# Patient Record
Sex: MALE | Race: WHITE | NOT HISPANIC OR LATINO | Employment: FULL TIME | URBAN - METROPOLITAN AREA
[De-identification: names, ages, dates, MRNs, and addresses within clinical notes are randomized per-mention and may not be internally consistent; named-entity substitution may affect disease eponyms.]

---

## 2020-12-01 ENCOUNTER — NURSE TRIAGE (OUTPATIENT)
Dept: OTHER | Facility: OTHER | Age: 40
End: 2020-12-01

## 2020-12-01 DIAGNOSIS — Z20.828 SARS-ASSOCIATED CORONAVIRUS EXPOSURE: Primary | ICD-10-CM

## 2020-12-02 DIAGNOSIS — Z20.828 SARS-ASSOCIATED CORONAVIRUS EXPOSURE: ICD-10-CM

## 2020-12-02 PROCEDURE — U0003 INFECTIOUS AGENT DETECTION BY NUCLEIC ACID (DNA OR RNA); SEVERE ACUTE RESPIRATORY SYNDROME CORONAVIRUS 2 (SARS-COV-2) (CORONAVIRUS DISEASE [COVID-19]), AMPLIFIED PROBE TECHNIQUE, MAKING USE OF HIGH THROUGHPUT TECHNOLOGIES AS DESCRIBED BY CMS-2020-01-R: HCPCS | Performed by: FAMILY MEDICINE

## 2020-12-03 LAB — SARS-COV-2 RNA SPEC QL NAA+PROBE: NOT DETECTED

## 2024-04-21 ENCOUNTER — NURSE TRIAGE (OUTPATIENT)
Dept: OTHER | Facility: OTHER | Age: 44
End: 2024-04-21

## 2024-04-21 ENCOUNTER — TELEPHONE (OUTPATIENT)
Dept: OTHER | Facility: OTHER | Age: 44
End: 2024-04-21

## 2024-04-21 NOTE — TELEPHONE ENCOUNTER
"Regarding: stomach pain/ appointment  ----- Message from Bia Vences sent at 4/21/2024  6:27 AM EDT -----  \"I would like to schedule an appointment. I have been having issues with my stomach and pain.\"    (Pt may be intoxicated)    "

## 2024-04-21 NOTE — TELEPHONE ENCOUNTER
"Reason for Disposition  • [1] MODERATE pain (e.g., interferes with normal activities) AND [2] pain comes and goes (cramps) AND [3] present > 24 hours  (Exception: pain with Vomiting or Diarrhea - see that Guideline)    Answer Assessment - Initial Assessment Questions  1. LOCATION: \"Where does it hurt?\"       RLQ  2. RADIATION: \"Does the pain shoot anywhere else?\" (e.g., chest, back)      LLQ  3. ONSET: \"When did the pain begin?\" (Minutes, hours or days ago)       3 months ago   4. SUDDEN: \"Gradual or sudden onset?\"      Gradual   5. PATTERN \"Does the pain come and go, or is it constant?\"     - If constant: \"Is it getting better, staying the same, or worsening?\"       (Note: Constant means the pain never goes away completely; most serious pain is constant and it progresses)      - If intermittent: \"How long does it last?\" \"Do you have pain now?\"      (Note: Intermittent means the pain goes away completely between bouts)      Intermittent   6. SEVERITY: \"How bad is the pain?\"  (e.g., Scale 1-10; mild, moderate, or severe)     - MILD (1-3): doesn't interfere with normal activities, abdomen soft and not tender to touch      - MODERATE (4-7): interferes with normal activities or awakens from sleep, tender to touch      - SEVERE (8-10): excruciating pain, doubled over, unable to do any normal activities        Mild-moderate   7. RECURRENT SYMPTOM: \"Have you ever had this type of stomach pain before?\" If Yes, ask: \"When was the last time?\" and \"What happened that time?\"       Similar to appendicitis he had prior . Confirms appendectomy   8. CAUSE: \"What do you think is causing the stomach pain?\"      Denies   9. RELIEVING/AGGRAVATING FACTORS: \"What makes it better or worse?\" (e.g., movement, antacids, bowel movement)     After work   10. OTHER SYMPTOMS: \"Has there been any vomiting, diarrhea, constipation, or urine problems?\"        \"Abdominal bloating\" , diarrhea (liquid stools) for about 2 1/2 months.    Protocols used: " Abdominal Pain - Male-ADULT-AH

## 2024-04-21 NOTE — TELEPHONE ENCOUNTER
C/o persistent lower abdominal pain, bloating, and diarrhea. No additional symptoms reported. Care advice given. Informed to call back if worsening/developing symptoms. Verbalized understanding. Agreeable with disposition. No further questions.

## 2024-11-26 ENCOUNTER — TELEPHONE (OUTPATIENT)
Dept: OTHER | Facility: OTHER | Age: 44
End: 2024-11-26

## 2024-11-26 NOTE — TELEPHONE ENCOUNTER
Patient called to schedule an appointment to establish acre and also to discuss regarding anxiety. Appointment was made for Monday 12/09 at 4:00 pm. Per patient, he would like an email to be sent with appointment information. Please assist      E-mail: [email or web address]  
Alert-The patient is alert, awake and responds to voice. The patient is oriented to time, place, and person. The triage nurse is able to obtain subjective information.

## 2024-12-29 ENCOUNTER — HOSPITAL ENCOUNTER (EMERGENCY)
Facility: HOSPITAL | Age: 44
Discharge: HOME | End: 2024-12-30
Attending: EMERGENCY MEDICINE
Payer: COMMERCIAL

## 2024-12-29 ENCOUNTER — APPOINTMENT (EMERGENCY)
Dept: RADIOLOGY | Facility: HOSPITAL | Age: 44
End: 2024-12-29
Payer: COMMERCIAL

## 2024-12-29 DIAGNOSIS — R03.0 ELEVATED BLOOD PRESSURE READING: Primary | ICD-10-CM

## 2024-12-29 DIAGNOSIS — N50.3 EPIDIDYMAL CYST: ICD-10-CM

## 2024-12-29 DIAGNOSIS — N50.89 TESTICULAR MICROLITHIASIS: ICD-10-CM

## 2024-12-29 LAB
BILIRUB UR QL STRIP: NEGATIVE
CLARITY UR: CLEAR
COLOR UR: NORMAL
GLUCOSE UR STRIP-MCNC: NEGATIVE MG/DL
HGB UR QL STRIP.AUTO: NEGATIVE
KETONES UR STRIP-MCNC: NEGATIVE MG/DL
LEUKOCYTE ESTERASE UR QL STRIP: NEGATIVE
NITRITE UR QL STRIP: NEGATIVE
PH UR STRIP.AUTO: 6 [PH]
PROT UR STRIP-MCNC: NEGATIVE MG/DL
SP GR UR STRIP.AUTO: 1.01 (ref 1–1.03)
UROBILINOGEN UR STRIP-ACNC: <2 MG/DL

## 2024-12-29 PROCEDURE — 76870 US EXAM SCROTUM: CPT

## 2024-12-29 PROCEDURE — 96372 THER/PROPH/DIAG INJ SC/IM: CPT

## 2024-12-29 PROCEDURE — 99284 EMERGENCY DEPT VISIT MOD MDM: CPT

## 2024-12-29 PROCEDURE — 99284 EMERGENCY DEPT VISIT MOD MDM: CPT | Performed by: EMERGENCY MEDICINE

## 2024-12-29 PROCEDURE — 81003 URINALYSIS AUTO W/O SCOPE: CPT

## 2024-12-29 RX ORDER — KETOROLAC TROMETHAMINE 30 MG/ML
15 INJECTION, SOLUTION INTRAMUSCULAR; INTRAVENOUS ONCE
Status: COMPLETED | OUTPATIENT
Start: 2024-12-29 | End: 2024-12-29

## 2024-12-29 RX ORDER — ACETAMINOPHEN 325 MG/1
975 TABLET ORAL ONCE
Status: COMPLETED | OUTPATIENT
Start: 2024-12-29 | End: 2024-12-29

## 2024-12-29 RX ADMIN — ACETAMINOPHEN 975 MG: 325 TABLET, FILM COATED ORAL at 22:29

## 2024-12-29 RX ADMIN — KETOROLAC TROMETHAMINE 15 MG: 30 INJECTION, SOLUTION INTRAMUSCULAR; INTRAVENOUS at 22:38

## 2024-12-30 VITALS
OXYGEN SATURATION: 99 % | HEART RATE: 111 BPM | TEMPERATURE: 97.8 F | SYSTOLIC BLOOD PRESSURE: 132 MMHG | RESPIRATION RATE: 19 BRPM | DIASTOLIC BLOOD PRESSURE: 78 MMHG

## 2024-12-30 NOTE — DISCHARGE INSTRUCTIONS
Your blood pressure was elevated in the emergency department today.  You should make an appointment with your doctor in the next 1 week for follow-up and recheck of the blood pressure.    Please use ice packs, bedrest, jockstrap or compressive underwear for relief of your epididymal cyst related right groin pain    Your ultrasound did demonstrate microlithiasis without concerning/suspicious features, would follow-up with urology regarding these results for further workup/management, additionally the ultrasound demonstrated epididymal cyst bilaterally which may be the cause of your pain, will treat with Motrin and Tylenol as well as the above home regimen

## 2024-12-30 NOTE — ED ATTENDING ATTESTATION
12/29/2024  I, Elgin Harper DO, saw and evaluated the patient. I have discussed the patient with the resident/non-physician practitioner and agree with the resident's/non-physician practitioner's findings, Plan of Care, and MDM as documented in the resident's/non-physician practitioner's note, except where noted. All available labs and Radiology studies were reviewed.  I was present for key portions of any procedure(s) performed by the resident/non-physician practitioner and I was immediately available to provide assistance.       At this point I agree with the current assessment done in the Emergency Department.  I have conducted an independent evaluation of this patient a history and physical is as follows:    Patient is a healthy 44-year-old male accompanied by his girlfriend.  He says the last 1 month he has noticed occasional throbbing in his right testicle and groin, tends to get worse when he standing or moving around, is not there all the time.  No falls, no trauma, no dysuria, no hematuria, no back pain, no fever, no chills, no trauma.  Had an appendectomy as a child but denies any past surgical history.  The patient says that he is sexually and augments with his girlfriend, he has no dyspareunia, no urethral drainage or discharge and his girlfriend says that she has no symptoms that she is aware of.  The patient does not have a primary care physician, has not sought medical show and for this prior to going to the ED, presents tonight because of the symptoms occurring for about a month.  The patient denies any chest pain, no shortness of breath, no visual changes, [place] bladder or bowel changes.    General:  Patient is well-appearing  Head:  Atraumatic  Eyes:  Conjunctiva pink  ENT:  Mucous membranes are moist  Neck:  Supple  Cardiac:  S1-S2, without murmurs  Lungs:  Clear to auscultation bilaterally  Abdomen:  Soft, nontender, normal bowel sounds, no CVA tenderness, no tympany, no rigidity, no  guarding  : Well-developed circumcised male, he has no rash or lesions to the penis or scrotum or perineal area, he has positive bilateral cremasteric reflexes.  He has some slight tenderness to the right epididymis, no other epididymal tenderness, no testicular tenderness, no abnormal testicular lie, no evidence of inguinal hernia  Extremities:  Normal range of motion  Neurologic:  Awake, fluent speech, normal comprehension. AAOx3.   Skin:  Pink warm and dry  Psychiatric:  Alert, pleasant, cooperative          ED Course       US scrotum and testicles   Final Result      No sonographic evidence of suspicious intratesticular lesion, epididymoorchitis, or torsion.      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  In the absence of any other risk factors for testicular cancer (e.g., personal history of testicular cancer, father or brother with testicular cancer, history    of cryptorchidism for maldescent, testicular atrophy, or other risk factors), no further imaging or biochemical follow-up is necessary; all that is recommended is routine monthly testicular self-examination.  However if the patient has risk factors for    testicular cancer, evaluation and determination of an optimal follow-up strategy is deferred to urologist. (Reference: AJR 2016: [id number].)      Epididymal cysts, right greater than left and other findings as above.      Workstation performed: [id number]           Labs Reviewed   UA W REFLEX TO MICROSCOPIC WITH REFLEX TO CULTURE       Result Value Ref Range Status    Color, UA Light Yellow   Final    Clarity, UA Clear   Final    Specific Gravity, UA 1.007  1.003 - 1.030 Final    pH, UA 6.0  4.5, 5.0, 5.5, 6.0, 6.5, 7.0, 7.5, 8.0 Final    Leukocytes, UA Negative  Negative Final    Nitrite, UA Negative  Negative Final    Protein, UA Negative  Negative mg/dl Final    Glucose, UA Negative  Negative mg/dl Final    Ketones, UA Negative  Negative  mg/dl Final    Urobilinogen, UA <2.0  <2.0 mg/dl mg/dl Final    Bilirubin, UA Negative  Negative Final    Occult Blood, UA Negative  Negative Final     On reassessment patient was feeling a little more comfortable.  At this point the cause of the patient's symptoms is unclear but unlikely to represent an acute emergency.  There is no evidence of testicular torsion, no evidence of epididymitis, no evidence of cellulitis or Lashanda's gangrene, no evidence of urinary tract infection.  Counseled no STI symptoms, do not believe that STI testing is indicated.  The ultrasound does show testicular microlithiasis bilaterally, this abnormality was discussed with the patient and patient was referred to urology for further follow-up. Supportive care, importance of follow-up and return precautions were discussed with the patient, who expressed understanding.    While the patient has been hypertensive in the emergency department, there is no evidence of hypertensive emergency, no evidence of end-organ damage on exam or per history. I do not believe the patient requires emergent lowering of their blood pressure. I considered obtaining laboratory studies however I believe it is reasonable for the patient to be discharged and have outpatient follow up for reassessment of their blood pressure, and if it is still elevated, for additional decisions regarding their management to be made by a primary care physician. I did discuss with the patient their elevated blood pressure reading, the importance of follow up for their elevated blood pressure reading, and the risks associated with untreated hypertension.      DIAGNOSIS:  Acute testicular pain, elevated blood pressure reading    MEDICAL DECISION MAKING CODING    COLLECTION AND INTERPRETATION OF DATA    I ordered each unique test  Tests reviewed personally by me:  Labs: See above, no infection  Imaging: I independently interpreted the testicular ultrasound, and found no evidence of  testicular torsion, no epididymitis.                Critical Care Time  Procedures

## 2024-12-30 NOTE — ED PROVIDER NOTES
Time reflects when diagnosis was documented in both MDM as applicable and the Disposition within this note       Time User Action Codes Description Comment    12/29/2024 10:21 PM Elgin Harper Add [R03.0] Elevated blood pressure reading     12/30/2024 12:28 AM Leopoldo Mead Add [N50.3] Epididymal cyst     12/30/2024 12:28 AM Leopoldo Mead Add [N50.89] Testicular microlithiasis           ED Disposition       ED Disposition   Discharge    Condition   Stable    Date/Time   Mon Dec 30, 2024 12:28 AM    Comment   Aly Adams discharge to home/self care.                   Assessment & Plan       Medical Decision Making  Patient is a 44 y.o. male with PMH of nothing pertinent, who presents to the ED with complaint of testicular pain    Vital signs on arrival patient's blood pressure is grossly elevated, 188/144, pulse is elevated 130, afebrile, respiratory rate is within normal limits, patient is saturating appropriately on room air    On exam patient is alert, oriented, no evidence respiratory distress, see physical exam for additional details    History and physical exam most consistent with orchitis, however, differential diagnosis included but not limited to UTI, epididymitis, testicular torsion, plan ultrasound to rule out testicular torsion, urinalysis to rule out UTI    View ED course above for further discussion on patient workup.     Patient's ultrasound results demonstrates no evidence of testicular torsion    Urinalysis without evidence of infection    All labs reviewed and utilized in the medical decision making process  All radiology studies independently viewed by me and interpreted by the radiologist.  I reviewed all testing with the patient.     Upon re-evaluation Patient continues remain hemodynamically stable with no new symptom/complaint, status post administration of Toradol and Tylenol patient is having relief of pain, still states he has some breakthrough pain, but overall improved, continues with  ability to urinate, no abdominal pain nausea or vomiting, recommendations given to patient for relief of pain secondary to epididymal cyst, consultation placed to the urology for microlithiasis    Plan for care discussed with patient, patient verbalized understanding, educated on symptoms concerning for return to the emergency department, PCP follow-up recommended.    Amount and/or Complexity of Data Reviewed  Labs:  Decision-making details documented in ED Course.  Radiology: ordered.    Risk  OTC drugs.  Prescription drug management.        ED Course as of 12/30/24 0043   Sun Dec 29, 2024   2159 L testicle no Pain, R side pain, feels like someone is squeezing his R testicle, is having symptoms on and off but worsening now, uncomfortable, then becomes anxious, no known hx of the same, fevers last night, not documented, -sore throat/cough/congestion, no sick contacts, -n/v, being on feet makes it worse, not constant, at this time 8/10, ibuprofen last night w/o relief, R side swollen, no color change, -burning w/ urination, -change in frequency,    2239 Ultrasound tech contacted, on the way to evaluate the patient   2256 Nitrite, UA: Negative   2256 Pulse(!): 112  Improvement in patient's tachycardia   Mon Dec 30, 2024   0027 IMPRESSION:     No sonographic evidence of suspicious intratesticular lesion, epididymoorchitis, or torsion.     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  In the absence of any other risk factors for testicular cancer (e.g., personal history of testicular cancer, father or brother with testicular cancer, history   of cryptorchidism for maldescent, testicular atrophy, or other risk factors), no further imaging or biochemical follow-up is necessary; all that is recommended is routine monthly testicular self-examination.  However if the patient has risk factors for   testicular cancer, evaluation and determination of an optimal follow-up  strategy is deferred to urologist. (Reference: AJR 2016: [id number].)     Epididymal cysts, right greater than left and other findings as above.       Medications   ketorolac (TORADOL) injection 15 mg (15 mg Intramuscular Given 12/29/24 2238)   acetaminophen (TYLENOL) tablet 975 mg (975 mg Oral Given 12/29/24 2229)       ED Risk Strat Scores                                              History of Present Illness       Chief Complaint   Patient presents with    Personal Problem     Pt. C/o that his right testicle is swollen, throbbing when sleeping now causing pt anxiety, started a month ago thinks its from stress. Had wine today for his bday.        History reviewed. No pertinent past medical history.   History reviewed. No pertinent surgical history.   History reviewed. No pertinent family history.   Social History     Tobacco Use    Smoking status: Every Day     Types: Cigarettes   Substance Use Topics    Alcohol use: Yes     Comment: occasional alcohol usage    Drug use: No      E-Cigarette/Vaping      E-Cigarette/Vaping Substances      I have reviewed and agree with the history as documented.     L testicle no Pain, R side pain, feels like someone is squeezing his R testicle, is having symptoms on and off but worsening now, uncomfortable, then becomes anxious, no known hx of the same, fevers last night, not documented, -sore throat/cough/congestion, no sick contacts, -n/v, being on feet makes it worse, not constant, at this time 8/10, ibuprofen last night w/o relief, R side swollen, no color change, -burning w/ urination, -change in frequency        Review of Systems        Objective       ED Triage Vitals [12/29/24 2132]   Temperature Pulse Blood Pressure Respirations SpO2 Patient Position - Orthostatic VS   97.8 °F (36.6 °C) (!) 130 (!) 198/144 18 98 % Sitting      Temp Source Heart Rate Source BP Location FiO2 (%) Pain Score    Temporal Monitor Left arm -- --      Vitals      Date and Time Temp Pulse SpO2  Resp BP Pain Score FACES Pain Rating User   12/29/24 2220 -- 112 -- -- -- -- -- AR   12/29/24 2132 97.8 °F (36.6 °C) 130 98 % 18 198/144 -- -- BK            Physical Exam  Vitals and nursing note reviewed.   Constitutional:       General: He is not in acute distress.     Appearance: He is well-developed. He is not ill-appearing or toxic-appearing.   HENT:      Head: Normocephalic and atraumatic.   Eyes:      Conjunctiva/sclera: Conjunctivae normal.   Cardiovascular:      Rate and Rhythm: Normal rate and regular rhythm.      Heart sounds: No murmur heard.  Pulmonary:      Effort: Pulmonary effort is normal. No respiratory distress.      Breath sounds: Normal breath sounds.   Abdominal:      Palpations: Abdomen is soft.      Tenderness: There is no abdominal tenderness. There is no guarding or rebound.   Genitourinary:     Penis: Normal.       Comments: Patient right testicular pain to palpation, no evidence of erythema, no evidence swelling, the right testicle feels similar to the left, there is no palpable deformity or mass, patient is tender to palpation of the epididymal region, no palpable evidence of hernia in the right or left scrotal sac/testicle    Scrotal lie is appropriate and similar, cremasteric reflexes present bilaterally  Musculoskeletal:         General: No swelling.      Cervical back: Neck supple.   Skin:     General: Skin is warm and dry.      Capillary Refill: Capillary refill takes less than 2 seconds.   Neurological:      Mental Status: He is alert.   Psychiatric:         Mood and Affect: Mood normal.      Comments: Patient is extremely energetic, almost manic or agitated appearing, reports that he is anxious, denies alcohol or drug use         Results Reviewed       Procedure Component Value Units Date/Time    UA w Reflex to Microscopic w Reflex to Culture [[id number]] Collected: 12/29/24 2234    Lab Status: Final result Specimen: Urine, Clean Catch Updated: 12/29/24 2245     Color, UA Light  Yellow     Clarity, UA Clear     Specific Gravity, UA 1.007     pH, UA 6.0     Leukocytes, UA Negative     Nitrite, UA Negative     Protein, UA Negative mg/dl      Glucose, UA Negative mg/dl      Ketones, UA Negative mg/dl      Urobilinogen, UA <2.0 mg/dl      Bilirubin, UA Negative     Occult Blood, UA Negative            US scrotum and testicles   Final Interpretation by Romulo Ruiz DO (12/30 0019)      No sonographic evidence of suspicious intratesticular lesion, epididymoorchitis, or torsion.      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  In the absence of any other risk factors for testicular cancer (e.g., personal history of testicular cancer, father or brother with testicular cancer, history    of cryptorchidism for maldescent, testicular atrophy, or other risk factors), no further imaging or biochemical follow-up is necessary; all that is recommended is routine monthly testicular self-examination.  However if the patient has risk factors for    testicular cancer, evaluation and determination of an optimal follow-up strategy is deferred to urologist. (Reference: AJR 2016: [id number].)      Epididymal cysts, right greater than left and other findings as above.      Workstation performed: [id number]             Procedures    ED Medication and Procedure Management   Prior to Admission Medications   Prescriptions Last Dose Informant Patient Reported? Taking?   ibuprofen (MOTRIN) 400 mg tablet   No No   Sig: Take 1 tablet (400 mg total) by mouth every 6 (six) hours as needed for mild pain.      Facility-Administered Medications: None     Patient's Medications   Discharge Prescriptions    No medications on file       ED SEPSIS DOCUMENTATION   Time reflects when diagnosis was documented in both MDM as applicable and the Disposition within this note       Time User Action Codes Description Comment    12/29/2024 10:21 PM Elgin Harper Add [R03.0]  Elevated blood pressure reading     12/30/2024 12:28 AM Leopoldo Mead Add [N50.3] Epididymal cyst     12/30/2024 12:28 AM Leopoldo Mead [N50.89] Testicular microlithiasis                  Leopoldo Mead DO  12/30/24 0043

## 2025-04-23 ENCOUNTER — HOSPITAL ENCOUNTER (EMERGENCY)
Facility: HOSPITAL | Age: 45
Discharge: HOME | End: 2025-04-23
Attending: EMERGENCY MEDICINE

## 2025-04-23 ENCOUNTER — APPOINTMENT (EMERGENCY)
Dept: RADIOLOGY | Facility: HOSPITAL | Age: 45
End: 2025-04-23

## 2025-04-23 VITALS
OXYGEN SATURATION: 97 % | HEART RATE: 92 BPM | DIASTOLIC BLOOD PRESSURE: 88 MMHG | SYSTOLIC BLOOD PRESSURE: 184 MMHG | RESPIRATION RATE: 18 BRPM | TEMPERATURE: 98.1 F

## 2025-04-23 DIAGNOSIS — S29.011A STRAIN OF LEFT PECTORALIS MUSCLE, INITIAL ENCOUNTER: Primary | ICD-10-CM

## 2025-04-23 DIAGNOSIS — R03.0 ELEVATED BLOOD PRESSURE READING: ICD-10-CM

## 2025-04-23 LAB
ATRIAL RATE: 88 BPM
P AXIS: 70 DEGREES
PR INTERVAL: 136 MS
QRS AXIS: 52 DEGREES
QRSD INTERVAL: 78 MS
QT INTERVAL: 344 MS
QTC INTERVAL: 417 MS
T WAVE AXIS: 57 DEGREES
VENTRICULAR RATE: 88 BPM

## 2025-04-23 PROCEDURE — 93010 ELECTROCARDIOGRAM REPORT: CPT | Performed by: INTERNAL MEDICINE

## 2025-04-23 PROCEDURE — 99283 EMERGENCY DEPT VISIT LOW MDM: CPT

## 2025-04-23 PROCEDURE — 99285 EMERGENCY DEPT VISIT HI MDM: CPT | Performed by: EMERGENCY MEDICINE

## 2025-04-23 PROCEDURE — 93005 ELECTROCARDIOGRAM TRACING: CPT

## 2025-04-23 PROCEDURE — 71046 X-RAY EXAM CHEST 2 VIEWS: CPT

## 2025-04-23 PROCEDURE — 96372 THER/PROPH/DIAG INJ SC/IM: CPT

## 2025-04-23 RX ORDER — ACETAMINOPHEN 325 MG/1
975 TABLET ORAL ONCE
Status: COMPLETED | OUTPATIENT
Start: 2025-04-23 | End: 2025-04-23

## 2025-04-23 RX ORDER — METHOCARBAMOL 500 MG/1
500 TABLET, FILM COATED ORAL ONCE
Status: COMPLETED | OUTPATIENT
Start: 2025-04-23 | End: 2025-04-23

## 2025-04-23 RX ORDER — KETOROLAC TROMETHAMINE 30 MG/ML
15 INJECTION, SOLUTION INTRAMUSCULAR; INTRAVENOUS ONCE
Status: COMPLETED | OUTPATIENT
Start: 2025-04-23 | End: 2025-04-23

## 2025-04-23 RX ORDER — NAPROXEN 500 MG/1
500 TABLET ORAL 2 TIMES DAILY WITH MEALS
Qty: 20 TABLET | Refills: 0 | Status: SHIPPED | OUTPATIENT
Start: 2025-04-23 | End: 2025-05-03

## 2025-04-23 RX ORDER — LIDOCAINE 50 MG/G
1 PATCH TOPICAL ONCE
Status: DISCONTINUED | OUTPATIENT
Start: 2025-04-23 | End: 2025-04-23 | Stop reason: HOSPADM

## 2025-04-23 RX ADMIN — METHOCARBAMOL 500 MG: 500 TABLET ORAL at 16:01

## 2025-04-23 RX ADMIN — LIDOCAINE 1 PATCH: 700 PATCH TOPICAL at 16:03

## 2025-04-23 RX ADMIN — KETOROLAC TROMETHAMINE 15 MG: 30 INJECTION, SOLUTION INTRAMUSCULAR; INTRAVENOUS at 16:02

## 2025-04-23 RX ADMIN — ACETAMINOPHEN 975 MG: 325 TABLET, FILM COATED ORAL at 16:01

## 2025-04-23 NOTE — ED ATTENDING ATTESTATION
4/23/2025  I, Raul Carreon MD, saw and evaluated the patient. I have discussed the patient with the resident/non-physician practitioner and agree with the resident's/non-physician practitioner's findings, Plan of Care, and MDM as documented in the resident's/non-physician practitioner's note, except where noted. All available labs and Radiology studies were reviewed.  I was present for key portions of any procedure(s) performed by the resident/non-physician practitioner and I was immediately available to provide assistance.       At this point I agree with the current assessment done in the Emergency Department.  I have conducted an independent evaluation of this patient a history and physical is as follows:  Left arm pain   Was at gym  bench pressing    And felt pain while lifting   Worse to lift arm  touch or move     No neuro symptoms no fever  no sob  no cough   no leg pain or swelling  no swelling of the arm   Exam nad  Anicteric mmm neck is nontender full range of motion lungs clear heart rrr no m  abd soft nt nd pos bs  ext  normal pulses sensation and skin  no edema   Tenderness noted over the lateral aspect of his pectoralis muscle on the left pain is worse if the area is palpated or if he lifts his arm away from his body   no crepitation noted no bruising noted no redness no skin rash   motor strength is normal sensation is normal in the upper extremity  Impression pectoralis muscle strain    ED Course   EKG  Normal sinus rhythm no acute ischemic changes LVH by voltage normal intervals    Critical Care Time  Procedures

## 2025-04-23 NOTE — ED PROVIDER NOTES
Time reflects when diagnosis was documented in both MDM as applicable and the Disposition within this note       Time User Action Codes Description Comment    4/23/2025  3:41 PM Lazaro Interiano Add [S29.011A] Strain of left pectoralis muscle, initial encounter     4/23/2025  3:42 PM Lazaro Interiano Add [R03.0] Elevated blood pressure reading           ED Disposition       ED Disposition   Discharge    Condition   Stable    Date/Time   Wed Apr 23, 2025  3:40 PM    Comment   Aly Adams discharge to home/self care.                   Assessment & Plan       Medical Decision Making  44-year-old otherwise healthy male presents to the emergency room for evaluation of left pectoralis muscle pain with associated left arm tingling and noticing a vein popping out of his arm for about a week.  He thinks that he hurt his chest from being at the gym while doing bench presses because he was lifting heavier weight than normal.  He says that the symptoms come and go and are more present when he relaxes and then he gets anxiety about it and is says his blood pressure goes up and he is worried that something very bad is going to happen.  He otherwise has felt okay.  He does smoke 1 to 2 cigarettes/day.  He has a history of heart disease in his grandfather but is unsure of age of first heart attack if any.  Denies fevers, chills, headache, lightheadedness, dizziness, shortness of breath, cough, abdominal pain, nausea, vomiting, weakness, decreased urine output, rashes.    Patient hypertensive with otherwise normal vital signs on presentation.  Patient is overall well-appearing and not in any acute distress.  Alert and oriented x 3. Conjunctivae are normal and there is no scleral icterus.  Mucous membranes are moist.  Neck is supple.  Heart sounds are normal with regular rate and rhythm.  Lungs are clear to auscultation.  Abdomen is soft and nondistended and with no tenderness to palpation.  Distal pulses are equal and intact.   There is no lower extremity edema.  Capillary refill is normal. There is no focal numbness or weakness of the upper or lower extremities.  There is tenderness and tightness felt in the left pectoralis major muscle proximal to the tendon as it inserts on the humerus with no overlying wounds, erythema, bruising.  Patient does have normal range of motion of the left shoulder.  Patient has no bony tenderness.  There is no swelling of the left upper extremity.  Superficial veins of the left upper extremity are unremarkable. Skin is otherwise warm and dry and there are no rashes or jaundice. Speech is somewhat rapid and pressured. Patient is otherwise cooperative and is not agitated.    History and physical exam is consistent with pectoralis muscle strain with likely resultant spasm causing the patient's symptoms.  I have no concern for cardiac etiology at this time.  Lung pathology such as pneumothorax less likely.  Will get ECG to screen for cardiac etiology.  Will get chest x-ray to screen for chest pathology and rule out pneumothorax.  Will also treat with multimodal analgesia.    Chest x-ray without any acute findings.  Patient is feeling much better after symptomatic treatment.  I discussed plan for continued supportive treatment at home and physical therapy referral and primary care follow-up for continued symptoms as well as to reevaluate blood pressure. Patient voiced understanding of the plan and all questions were answered. Strict return precautions given. Patient is hemodynamically stable and safe for discharge at this time.      Amount and/or Complexity of Data Reviewed  Radiology: ordered.    Risk  OTC drugs.  Prescription drug management.             Medications   ketorolac (TORADOL) injection 15 mg (15 mg Intramuscular Given 4/23/25 1602)   acetaminophen (TYLENOL) tablet 975 mg (975 mg Oral Given 4/23/25 1601)   methocarbamol (ROBAXIN) tablet 500 mg (500 mg Oral Given 4/23/25 1601)       ED Risk Strat  Scores                    No data recorded                            History of Present Illness       Chief Complaint   Patient presents with    Arm Pain     Pt states he has been having L arm pain from shoulder, thinks it could be from lifting weights at the gym/pulled a muscle. Denies CP       History reviewed. No pertinent past medical history.   History reviewed. No pertinent surgical history.   History reviewed. No pertinent family history.   Social History     Tobacco Use    Smoking status: Every Day     Types: Cigarettes   Substance Use Topics    Alcohol use: Yes     Comment: occasional alcohol usage    Drug use: No      E-Cigarette/Vaping      E-Cigarette/Vaping Substances      I have reviewed and agree with the history as documented.     HPI    Review of Systems        Objective       ED Triage Vitals   Temperature Pulse Blood Pressure Respirations SpO2 Patient Position - Orthostatic VS   04/23/25 1451 04/23/25 1451 04/23/25 1451 04/23/25 1451 04/23/25 1451 --   98.1 °F (36.7 °C) 92 (!) 184/88 18 97 %       Temp src Heart Rate Source BP Location FiO2 (%) Pain Score    -- -- -- -- 04/23/25 1601        6      Vitals      Date and Time Temp Pulse SpO2 Resp BP Pain Score FACES Pain Rating User   04/23/25 1601 -- -- -- -- -- 6 -- FOREST   04/23/25 1451 98.1 °F (36.7 °C) 92 97 % 18 184/88 -- -- KIY            Physical Exam    Results Reviewed       None            XR chest 2 views   Final Interpretation by Vidal Carpenter MD (04/23 1638)      No acute cardiopulmonary disease.            Workstation performed: [id number]             ECG 12 Lead Documentation Only    Date/Time: 4/23/2025 3:46 PM    Performed by: Lazaro Interiano DO  Authorized by: Lazaro Interiano DO    ECG reviewed by me, the ED Provider: yes    Previous ECG:     Previous ECG:  Unavailable  Interpretation:     Interpretation: normal    Rate:     ECG rate:  88    ECG rate assessment: normal    Rhythm:     Rhythm: sinus rhythm    Ectopy:      Ectopy: none    QRS:     QRS axis:  Normal    QRS intervals:  Normal  Conduction:     Conduction: normal    ST segments:     ST segments:  Normal  T waves:     T waves: normal        ED Medication and Procedure Management   Prior to Admission Medications   Prescriptions Last Dose Informant Patient Reported? Taking?   ibuprofen (MOTRIN) 400 mg tablet   No No   Sig: Take 1 tablet (400 mg total) by mouth every 6 (six) hours as needed for mild pain.      Facility-Administered Medications: None     Discharge Medication List as of 4/23/2025  3:45 PM        START taking these medications    Details   naproxen (Naprosyn) 500 mg tablet Take 1 tablet (500 mg total) by mouth 2 (two) times a day with meals for 10 days, Starting Wed 4/23/2025, Until Sat 5/3/2025, Normal           CONTINUE these medications which have NOT CHANGED    Details   ibuprofen (MOTRIN) 400 mg tablet Take 1 tablet (400 mg total) by mouth every 6 (six) hours as needed for mild pain., Starting 3/31/2016, Until Discontinued, Print             ED SEPSIS DOCUMENTATION   Time reflects when diagnosis was documented in both MDM as applicable and the Disposition within this note       Time User Action Codes Description Comment    4/23/2025  3:41 PM Lazaro Interiano [S29.011A] Strain of left pectoralis muscle, initial encounter     4/23/2025  3:42 PM Lazaro Interiano [R03.0] Elevated blood pressure reading                  Lazaro Interiano, DO  04/25/25 0713       Lazaro Interiano, DO  04/25/25 0715

## 2025-04-23 NOTE — DISCHARGE INSTRUCTIONS
Please schedule an appointment with the primary care doctor to be evaluated. For the muscle strain, I would recommend that you use the naproxen that I prescribed you and then Tylenol on top of this if needed.  You should also ice the area and give it rest and stretch it.  If your symptoms do not resolve, please schedule an appointment with physical therapy.  You should return to the emergency room if you have severe uncontrolled pain, if you cannot move your arm at all, or if you cannot catch your breath even at rest.

## 2025-05-01 PROCEDURE — 93010 ELECTROCARDIOGRAM REPORT: CPT | Performed by: INTERNAL MEDICINE
